# Patient Record
Sex: MALE | Race: WHITE | NOT HISPANIC OR LATINO | Employment: FULL TIME | URBAN - METROPOLITAN AREA
[De-identification: names, ages, dates, MRNs, and addresses within clinical notes are randomized per-mention and may not be internally consistent; named-entity substitution may affect disease eponyms.]

---

## 2021-06-07 ENCOUNTER — OFFICE VISIT (OUTPATIENT)
Dept: ORTHOPEDIC SURGERY | Facility: CLINIC | Age: 26
End: 2021-06-07

## 2021-06-07 VITALS
HEIGHT: 75 IN | SYSTOLIC BLOOD PRESSURE: 138 MMHG | BODY MASS INDEX: 22.38 KG/M2 | HEART RATE: 74 BPM | DIASTOLIC BLOOD PRESSURE: 78 MMHG | WEIGHT: 180 LBS

## 2021-06-07 DIAGNOSIS — S43.004A DISLOCATION OF RIGHT SHOULDER JOINT, INITIAL ENCOUNTER: Primary | ICD-10-CM

## 2021-06-07 PROCEDURE — 99203 OFFICE O/P NEW LOW 30 MIN: CPT | Performed by: ORTHOPAEDIC SURGERY

## 2021-06-07 RX ORDER — HYDROCODONE BITARTRATE AND ACETAMINOPHEN 5; 325 MG/1; MG/1
TABLET ORAL
COMMUNITY
Start: 2021-05-28 | End: 2021-06-07

## 2021-06-07 NOTE — PROGRESS NOTES
Subjective: Right shoulder pain     Patient ID: Jon Allen is a 25 y.o. male.    Chief Complaint:    History of Present Illness 25-year-old male right-hand-dominant seen by me today for the first time regarding his right shoulder she injured on May 28.  States he was lifting free weights using 55 pound dumbbells doing a  press and when he lifted the weight over his head but in front of him felt intense pain in the shoulder dislocate.  Was seen in the urgent care at [place] and underwent a closed reduction under IV sedation.  Denies any prior history of shoulder pain or discomfort presents now for evaluation.       Social History     Occupational History   • Not on file   Tobacco Use   • Smoking status: Never Smoker   • Smokeless tobacco: Current User     Types: Chew   Vaping Use   • Vaping Use: Never used   Substance and Sexual Activity   • Alcohol use: Yes   • Drug use: No   • Sexual activity: Never      Review of Systems   Constitutional: Negative for chills, diaphoresis, fever and unexpected weight change.   HENT: Negative for hearing loss, nosebleeds, sore throat and tinnitus.    Eyes: Negative for pain and visual disturbance.   Respiratory: Negative for cough, shortness of breath and wheezing.    Cardiovascular: Negative for chest pain and palpitations.   Gastrointestinal: Negative for abdominal pain, diarrhea, nausea and vomiting.   Endocrine: Negative for cold intolerance, heat intolerance and polydipsia.   Genitourinary: Negative for difficulty urinating, dysuria and hematuria.   Musculoskeletal: Positive for arthralgias and myalgias. Negative for joint swelling.   Skin: Negative for rash and wound.   Allergic/Immunologic: Negative for environmental allergies.   Neurological: Negative for dizziness, syncope and numbness.   Hematological: Does not bruise/bleed easily.   Psychiatric/Behavioral: Negative for dysphoric mood and sleep disturbance. The patient is not nervous/anxious.           History reviewed. No pertinent past medical history.  History reviewed. No pertinent surgical history.  Family History   Problem Relation Age of Onset   • No Known Problems Mother    • No Known Problems Father          Objective:  Vitals:    06/07/21 0933   BP: 138/78   Pulse: 74         06/07/21 0933   Weight: 81.6 kg (180 lb)     Body mass index is 22.5 kg/m².        Ortho Exam   X-ray from urgent care reviewed by me both pre and postreduction show no acute changes or fractures.  There is certainly no arthritic changes noted.  He is alert and oriented x3.  Has normocephalic and sclerae clear.  Right upper extremity has no motor or sensory deficit right radial ulnar median nerve function.  He has full range of motion of his elbow for flexion extension pronation supination flexion does cause some pain.  He can actively extend and abduct to about 90 degrees past which is painful.  Mildly positive Shawnee's test.  Mildly positive Fung sign.  Negative the points test.  External rotation is approximately 45 degrees did not test internal rotation.  Skin is cool to touch.  Biceps function is 5/5 deltoid function is 4 out of 5.  Is good capillary refill.    Assessment:        1. Dislocation of right shoulder joint, initial encounter           Plan: Reviewed the patient's x-rays and physical exam.  The concern with a dislocation a young patient is a labral tear.  I do not believe he is torn his rotator cuff but the physical exam and his history and his age concern for labral tear and I would get an MRI first before proceeding with definitive treatment.  He is begin 2 Aleve twice a day return to see me after the MRIs been completed.  Answered all questions            Work Status:    SAV query complete.    Orders:  Orders Placed This Encounter   Procedures   • MRI Shoulder Right Arthrogram       Medications:  No orders of the defined types were placed in this encounter.      Followup:  Return in about 2 weeks (around  6/21/2021).          Dictated utilizing Dragon dictation

## 2021-06-10 ENCOUNTER — TELEPHONE (OUTPATIENT)
Dept: ORTHOPEDIC SURGERY | Facility: CLINIC | Age: 26
End: 2021-06-10

## 2021-06-10 NOTE — TELEPHONE ENCOUNTER
Caller: WU JOSHI  Relationship to Patient: SELF     Phone Number: [phone]  Reason for Call: PATIENT CALLING STATING THAT HIS MRI IS NOT UNTIL 06/29/21, PATIENT WOULD LIKE TO KNOW IF DR RUTH WOULD LIKE HIM TO GO BACK INTO A SLING SINCE THE APPOINTMENT IS SO FAR OUT

## 2021-06-15 DIAGNOSIS — S43.004A DISLOCATION OF RIGHT SHOULDER JOINT, INITIAL ENCOUNTER: Primary | ICD-10-CM

## 2021-06-29 ENCOUNTER — HOSPITAL ENCOUNTER (OUTPATIENT)
Dept: MRI IMAGING | Facility: HOSPITAL | Age: 26
End: 2021-06-29

## 2021-06-29 ENCOUNTER — HOSPITAL ENCOUNTER (OUTPATIENT)
Dept: MRI IMAGING | Facility: HOSPITAL | Age: 26
Discharge: HOME | End: 2021-06-29

## 2021-06-29 ENCOUNTER — HOSPITAL ENCOUNTER (OUTPATIENT)
Dept: GENERAL RADIOLOGY | Facility: HOSPITAL | Age: 26
Discharge: HOME | End: 2021-06-29

## 2021-06-29 DIAGNOSIS — S43.004A DISLOCATION OF RIGHT SHOULDER JOINT, INITIAL ENCOUNTER: ICD-10-CM

## 2021-06-29 PROCEDURE — A9577 INJ MULTIHANCE: HCPCS | Performed by: ORTHOPAEDIC SURGERY

## 2021-06-29 PROCEDURE — 73222 MRI JOINT UPR EXTREM W/DYE: CPT

## 2021-06-29 PROCEDURE — 0 GADOBENATE DIMEGLUMINE 529 MG/ML SOLUTION: Performed by: ORTHOPAEDIC SURGERY

## 2021-06-29 PROCEDURE — 73040 CONTRAST X-RAY OF SHOULDER: CPT

## 2021-06-29 PROCEDURE — 25010000003 LIDOCAINE 1 % SOLUTION: Performed by: ORTHOPAEDIC SURGERY

## 2021-06-29 PROCEDURE — 25010000002 IOPAMIDOL 61 % SOLUTION: Performed by: ORTHOPAEDIC SURGERY

## 2021-06-29 RX ORDER — LIDOCAINE HYDROCHLORIDE 10 MG/ML
1 INJECTION, SOLUTION INFILTRATION; PERINEURAL ONCE
Status: COMPLETED | OUTPATIENT
Start: 2021-06-29 | End: 2021-06-29

## 2021-06-29 RX ADMIN — GADOBENATE DIMEGLUMINE 1 ML: 529 INJECTION, SOLUTION INTRAVENOUS at 10:26

## 2021-06-29 RX ADMIN — LIDOCAINE HYDROCHLORIDE 1 ML: 10 INJECTION, SOLUTION INFILTRATION; PERINEURAL at 09:33

## 2021-06-29 RX ADMIN — IOPAMIDOL 10 ML: 612 INJECTION, SOLUTION INTRAVENOUS at 09:34

## 2021-07-01 ENCOUNTER — OFFICE VISIT (OUTPATIENT)
Dept: ORTHOPEDIC SURGERY | Facility: CLINIC | Age: 26
End: 2021-07-01

## 2021-07-01 VITALS — BODY MASS INDEX: 23.1 KG/M2 | WEIGHT: 180 LBS | HEIGHT: 74 IN

## 2021-07-01 DIAGNOSIS — S46.111A LABRAL TEAR OF LONG HEAD OF RIGHT BICEPS TENDON, INITIAL ENCOUNTER: ICD-10-CM

## 2021-07-01 DIAGNOSIS — S43.004A DISLOCATION OF RIGHT SHOULDER JOINT, INITIAL ENCOUNTER: Primary | ICD-10-CM

## 2021-07-01 DIAGNOSIS — M21.821 HILL-SACHS LESION OF RIGHT SHOULDER: ICD-10-CM

## 2021-07-01 PROCEDURE — 99211 OFF/OP EST MAY X REQ PHY/QHP: CPT | Performed by: ORTHOPAEDIC SURGERY

## 2021-07-01 RX ORDER — MELOXICAM 15 MG/1
15 TABLET ORAL DAILY
Qty: 30 TABLET | Refills: 0 | Status: SHIPPED | OUTPATIENT
Start: 2021-07-01

## 2021-07-01 NOTE — PROGRESS NOTES
Subjective: Right shoulder dislocation     Patient ID: Jon Allen is a 25 y.o. male.    Chief Complaint:    History of Present Illness patient returns with results of MRI which I reviewed images and report shows a anterior posterior SLAP tear of the right shoulder along with a Hill-Sachs lesion.  He continues to have pain and discomfort although not severe.       Social History     Occupational History   • Not on file   Tobacco Use   • Smoking status: Never Smoker   • Smokeless tobacco: Former User     Types: Chew   Vaping Use   • Vaping Use: Never used   Substance and Sexual Activity   • Alcohol use: Yes   • Drug use: No   • Sexual activity: Never      Review of Systems   Constitutional: Negative for chills, diaphoresis, fever and unexpected weight change.   HENT: Negative for hearing loss, nosebleeds, sore throat and tinnitus.    Eyes: Negative for pain and visual disturbance.   Respiratory: Negative for cough, shortness of breath and wheezing.    Cardiovascular: Negative for chest pain and palpitations.   Gastrointestinal: Negative for abdominal pain, diarrhea, nausea and vomiting.   Endocrine: Negative for cold intolerance, heat intolerance and polydipsia.   Genitourinary: Negative for difficulty urinating, dysuria and hematuria.   Musculoskeletal: Positive for arthralgias.   Skin: Negative for rash and wound.   Allergic/Immunologic: Negative for environmental allergies.   Neurological: Negative for dizziness, syncope and numbness.   Hematological: Does not bruise/bleed easily.   Psychiatric/Behavioral: Negative for dysphoric mood and sleep disturbance. The patient is not nervous/anxious.    All other systems reviewed and are negative.        No past medical history on file.  No past surgical history on file.  Family History   Problem Relation Age of Onset   • No Known Problems Mother    • No Known Problems Father          Objective:  There were no vitals filed for this visit.      07/01/21  0919    Weight: 81.6 kg (180 lb)     Body mass index is 23.1 kg/m².        Ortho Exam   He is alert and oriented.  Again there is no motor or sensory deficit.    Assessment:        1. Dislocation of right shoulder joint, initial encounter    2. Hill-Sachs lesion of right shoulder    3. Labral tear of long head of right biceps tendon, initial encounter           Plan: Reviewed the results of the MRI with the patient and his mother.  I discussed the case with Dr. Lawson who feels he is a surgical candidate and I referred him to Dr. Lawson for his evaluation and treatment.  Answered all questions.  Advised about using 800 of ibuprofen 3 times a day and Tylenol for any pain.            Work Status:    SAV query complete.    Orders:  No orders of the defined types were placed in this encounter.      Medications:  No orders of the defined types were placed in this encounter.      Followup:  Return in about 1 week (around 7/8/2021).          Dictated utilizing Dragon dictation

## 2021-07-01 NOTE — TELEPHONE ENCOUNTER
Patient's mother calling stating he saw Dr. Bonilla this AM and is being referred to you- appt made for 07.08.2021 for a surgical consult- he is taking ibu 800mg and Tylenol for pain relief which is really not helping, they are asking if there is anything else NSAID wise he can take and if he could possibly have a telehealth consult for a pre-op for his surgical consult faster as he is getting  the first of September.        Study Result    Narrative & Impression   MRI Shoulder Arthrogram RT     INDICATION:    Dislocated right shoulder over 3 weeks ago doing overhead press. Evaluate for labral tear.     TECHNIQUE:   MRI of the  right shoulder with intra-articular contrast.     COMPARISON:   Conventional arthrogram 6/29/2021     FINDINGS:   Rotator cuff: Contrast distends the glenohumeral joint. No focal imbibition of contrast to suggest a partial or full-thickness rotator cuff tear. No tendinosis. No muscle atrophy or edema.     Glenoid labrum and biceps tendon: The superior labrum, biceps anchor and long tendon and biceps appears normal. The posterior labrum appears normal.     Trifurcation and loss of the normal anteroinferior labrum consistent with anteroinferior labral tear. No periosteal stripping injury. Glenoid articular cartilage appears intact. No bony Bankart. The glenohumeral ligaments appear intact. Labral tear  extends from the 3-4 o'clock labral axis and estimated less than a centimeter in length. Type III anterior capsular insertion.     AC joint: Minimal AC joint capsular hypertrophy. Trace amount of edema distal clavicle.     Articular Cartilage: The articular cartilage is of normal thickness. No focal defects are seen.     Bone: 2.5 cm focus of marrow edema posterior superior humeral head compatible with bone contusion. There is likely a shallow Hill-Sachs impaction fracture estimated 1.6 cm in length.     Deltoid and extra articular soft tissues appear normal.     IMPRESSION:  Small  anteroinferior labral tear as detailed above.     Bone contusion and likely shallow Hill-Sachs impaction fracture posterior superior humeral head.     Type III anterior capsular insertion with capacious joint capsule.     Signer Name: VICENTE Gregg MD   Signed: 6/30/2021 10:55 AM   Workstation Name: [email or web address]    [facility]     Please advise.

## 2021-07-01 NOTE — TELEPHONE ENCOUNTER
Spoke with Dr. Lawson- he will need to be seen in clinic for the surgical consult as he will need a full physical exam, he will also send in Mobic 15mg-1 po q AM to be taken with food.    RX pended for your approval.    I called and spoke with the patient so he is aware- also let him know that we spoke with his mother this time (she is was also present and on speaker phone)  she was listed on his emergency contact but in the further he will need to add her to his updated HIPPA form for our office. Patient agreeable.

## 2021-07-08 ENCOUNTER — OFFICE VISIT (OUTPATIENT)
Dept: ORTHOPEDIC SURGERY | Facility: CLINIC | Age: 26
End: 2021-07-08

## 2021-07-08 VITALS
HEART RATE: 72 BPM | WEIGHT: 180 LBS | SYSTOLIC BLOOD PRESSURE: 108 MMHG | HEIGHT: 74 IN | DIASTOLIC BLOOD PRESSURE: 90 MMHG | BODY MASS INDEX: 23.1 KG/M2

## 2021-07-08 DIAGNOSIS — S43.004A DISLOCATION OF RIGHT SHOULDER JOINT, INITIAL ENCOUNTER: Primary | ICD-10-CM

## 2021-07-08 DIAGNOSIS — S43.431A TEAR OF RIGHT GLENOID LABRUM, INITIAL ENCOUNTER: ICD-10-CM

## 2021-07-08 PROCEDURE — 73030 X-RAY EXAM OF SHOULDER: CPT | Performed by: ORTHOPAEDIC SURGERY

## 2021-07-08 PROCEDURE — 99213 OFFICE O/P EST LOW 20 MIN: CPT | Performed by: ORTHOPAEDIC SURGERY

## 2021-07-08 RX ORDER — PREDNISONE 10 MG/1
TABLET ORAL
Qty: 39 TABLET | Refills: 0 | Status: SHIPPED | OUTPATIENT
Start: 2021-07-08